# Patient Record
Sex: FEMALE | Race: WHITE | NOT HISPANIC OR LATINO | Employment: FULL TIME | URBAN - METROPOLITAN AREA
[De-identification: names, ages, dates, MRNs, and addresses within clinical notes are randomized per-mention and may not be internally consistent; named-entity substitution may affect disease eponyms.]

---

## 2021-04-26 ENCOUNTER — OFFICE VISIT (OUTPATIENT)
Dept: INTERNAL MEDICINE CLINIC | Facility: CLINIC | Age: 26
End: 2021-04-26
Payer: COMMERCIAL

## 2021-04-26 ENCOUNTER — CLINICAL SUPPORT (OUTPATIENT)
Dept: INTERNAL MEDICINE CLINIC | Age: 26
End: 2021-04-26
Payer: COMMERCIAL

## 2021-04-26 VITALS
WEIGHT: 117.2 LBS | DIASTOLIC BLOOD PRESSURE: 60 MMHG | BODY MASS INDEX: 20.01 KG/M2 | OXYGEN SATURATION: 98 % | TEMPERATURE: 98 F | HEIGHT: 64 IN | HEART RATE: 114 BPM | SYSTOLIC BLOOD PRESSURE: 94 MMHG

## 2021-04-26 DIAGNOSIS — Z09 NEED FOR IMMUNIZATION FOLLOW-UP: ICD-10-CM

## 2021-04-26 DIAGNOSIS — Z30.41 ENCOUNTER FOR SURVEILLANCE OF CONTRACEPTIVE PILLS: ICD-10-CM

## 2021-04-26 DIAGNOSIS — K50.119 CROHN'S DISEASE OF LARGE INTESTINE WITH COMPLICATION (HCC): ICD-10-CM

## 2021-04-26 DIAGNOSIS — Z13.228 SCREENING FOR METABOLIC DISORDER: Primary | ICD-10-CM

## 2021-04-26 DIAGNOSIS — Z23 NEED FOR TD VACCINE: ICD-10-CM

## 2021-04-26 DIAGNOSIS — Z23 NEED FOR INFLUENZA VACCINATION: Primary | ICD-10-CM

## 2021-04-26 PROCEDURE — 90686 IIV4 VACC NO PRSV 0.5 ML IM: CPT

## 2021-04-26 PROCEDURE — 99203 OFFICE O/P NEW LOW 30 MIN: CPT | Performed by: NURSE PRACTITIONER

## 2021-04-26 PROCEDURE — 90471 IMMUNIZATION ADMIN: CPT

## 2021-04-26 PROCEDURE — 90714 TD VACC NO PRESV 7 YRS+ IM: CPT

## 2021-04-26 PROCEDURE — 90472 IMMUNIZATION ADMIN EACH ADD: CPT

## 2021-04-26 RX ORDER — ACETAMINOPHEN AND CODEINE PHOSPHATE 120; 12 MG/5ML; MG/5ML
1 SOLUTION ORAL DAILY
COMMUNITY
End: 2021-04-26

## 2021-04-26 RX ORDER — ACETAMINOPHEN AND CODEINE PHOSPHATE 120; 12 MG/5ML; MG/5ML
1 SOLUTION ORAL DAILY
Qty: 30 TABLET | Refills: 6 | Status: SHIPPED | OUTPATIENT
Start: 2021-04-26

## 2021-04-26 RX ORDER — MULTIVITAMIN
1 TABLET ORAL DAILY
COMMUNITY

## 2021-04-26 RX ORDER — USTEKINUMAB 90 MG/ML
90 INJECTION, SOLUTION SUBCUTANEOUS
COMMUNITY
End: 2021-05-26 | Stop reason: SDUPTHER

## 2021-04-26 NOTE — ASSESSMENT & PLAN NOTE
Patient will get influenza and TDap vaccination at [facility] location today  Handout give for HPV vaccination

## 2021-04-26 NOTE — PROGRESS NOTES
Assessment/Plan:    Crohn's disease of large intestine with complication [facility])  Patient has appointment in May with [facility],  Continue with Stelara  Screening for metabolic disorder  Will get updated fasting blood work  Encounter for surveillance of contraceptive pills  Refill on Micronor sent  Need for immunization follow-up  Patient will get influenza and TDap vaccination at [facility] [place] location today  Handout give for HPV vaccination  Diagnoses and all orders for this visit:    Screening for metabolic disorder  -     Comprehensive metabolic panel; Future  -     CBC and differential  -     Lipid panel    Encounter for surveillance of contraceptive pills  -     norethindrone (MICRONOR) 0 35 MG tablet; Take 1 tablet (0 35 mg total) by mouth daily    Crohn's disease of large intestine with complication ([address] )    Need for immunization follow-up    Other orders  -     ustekinumab (Stelara) 90 mg/mL subcutaneous injection; Inject 90 mg under the skin every 56 days  -     Probiotic Product (PROBIOTIC-10 PO); Take by mouth daily  -     Multiple Vitamin (multivitamin) tablet; Take 1 tablet by mouth daily  -     Discontinue: norethindrone (Natalia-BE) 0 35 MG tablet; Take 1 tablet by mouth daily          Subjective:      Patient ID: Zoie Gallo is a 22 y o  female  Patient presents today to establish care with our practice  Patient has history of Crohn's disease  Moved from [place] to [place] for a job as a   Crohns Disease- has been on Stelara for about a year, will establish with [facility], last colonoscopy 2019 [facility]       Family history of Breast History, mother passed away from breast CA, diagnosed at age 45s  Patient reports does not do self breast exams       Cervical cancer screening- thinks she had one last year, but will get records       The following portions of the patient's history were reviewed and updated as appropriate: allergies, current medications, past family history, past medical history, past social history, past surgical history and problem list     Review of Systems   Constitutional: Negative for activity change, appetite change, chills, diaphoresis and fever  HENT: Negative for congestion, ear discharge, ear pain, postnasal drip, rhinorrhea, sinus pressure, sinus pain and sore throat  Eyes: Negative for pain, discharge, itching and visual disturbance  Respiratory: Negative for cough, chest tightness, shortness of breath and wheezing  Cardiovascular: Negative for chest pain, palpitations and leg swelling  Gastrointestinal: Negative for abdominal pain, constipation, diarrhea, nausea and vomiting  Endocrine: Negative for polydipsia, polyphagia and polyuria  Genitourinary: Negative for difficulty urinating, dysuria and urgency  Musculoskeletal: Negative for arthralgias, back pain and neck pain  Skin: Negative for rash and wound  Neurological: Negative for dizziness, weakness, numbness and headaches  Past Medical History:   Diagnosis Date    Crohn disease ([facility] )          Current Outpatient Medications:     Multiple Vitamin (multivitamin) tablet, Take 1 tablet by mouth daily, Disp: , Rfl:     Probiotic Product (PROBIOTIC-10 PO), Take by mouth daily, Disp: , Rfl:     ustekinumab (Stelara) 90 mg/mL subcutaneous injection, Inject 90 mg under the skin every 56 days, Disp: , Rfl:     norethindrone (MICRONOR) 0 35 MG tablet, Take 1 tablet (0 35 mg total) by mouth daily, Disp: 30 tablet, Rfl: 6    No Known Allergies    Social History   History reviewed  No pertinent surgical history    Family History   Problem Relation Age of Onset    Breast cancer additional onset Mother    [de-identified] ADD / ADHD Father     Diabetes Maternal Grandfather     Crohn's disease Paternal Grandfather        Objective:  BP 94/60 (BP Location: Left arm, Patient Position: Sitting, Cuff Size: Adult)   Pulse (!) 114   Temp 98 °F (36 7 °C) (Tympanic)   Ht 5' 4 29" (1 633 m) Wt 53 2 kg (117 lb 3 2 oz)   SpO2 98% Comment: Room Air  BMI 19 94 kg/m²     Recent Results (from the past 1344 hour(s))   NOVEL CORONAVIRUS (COVID-19), PCR [facility]    Collection Time: 03/13/21 10:25 AM   Result Value Ref Range    SARS-CoV-2 Negative Negative            Physical Exam  Constitutional:       General: She is not in acute distress  Appearance: She is well-developed  She is not diaphoretic  HENT:      Head: Normocephalic and atraumatic  Right Ear: External ear normal       Left Ear: External ear normal       Nose: Nose normal       Mouth/Throat:      Pharynx: No oropharyngeal exudate  Eyes:      General:         Right eye: No discharge  Left eye: No discharge  Conjunctiva/sclera: Conjunctivae normal       Pupils: Pupils are equal, round, and reactive to light  Neck:      Musculoskeletal: Normal range of motion and neck supple  Thyroid: No thyromegaly  Cardiovascular:      Rate and Rhythm: Normal rate and regular rhythm  Heart sounds: Normal heart sounds  No murmur  No friction rub  No gallop  Pulmonary:      Effort: Pulmonary effort is normal  No respiratory distress  Breath sounds: Normal breath sounds  No stridor  No wheezing or rales  Abdominal:      General: Bowel sounds are normal  There is no distension  Palpations: Abdomen is soft  Tenderness: There is no abdominal tenderness  Lymphadenopathy:      Cervical: No cervical adenopathy  Skin:     General: Skin is warm and dry  Findings: No erythema or rash  Neurological:      Mental Status: She is alert and oriented to person, place, and time  Psychiatric:         Behavior: Behavior normal          Thought Content:  Thought content normal          Judgment: Judgment normal

## 2021-05-05 ENCOUNTER — OFFICE VISIT (OUTPATIENT)
Dept: GASTROENTEROLOGY | Facility: MEDICAL CENTER | Age: 26
End: 2021-05-05
Payer: COMMERCIAL

## 2021-05-05 VITALS
HEART RATE: 84 BPM | DIASTOLIC BLOOD PRESSURE: 70 MMHG | BODY MASS INDEX: 20.49 KG/M2 | WEIGHT: 120 LBS | TEMPERATURE: 98 F | HEIGHT: 64 IN | SYSTOLIC BLOOD PRESSURE: 112 MMHG

## 2021-05-05 DIAGNOSIS — K50.919 CROHN'S DISEASE WITH COMPLICATION, UNSPECIFIED GASTROINTESTINAL TRACT LOCATION (HCC): Primary | ICD-10-CM

## 2021-05-05 PROCEDURE — 99204 OFFICE O/P NEW MOD 45 MIN: CPT | Performed by: INTERNAL MEDICINE

## 2021-05-05 RX ORDER — USTEKINUMAB 90 MG/ML
90 INJECTION, SOLUTION SUBCUTANEOUS ONCE
COMMUNITY
End: 2021-05-26

## 2021-05-05 RX ORDER — ACETAMINOPHEN AND CODEINE PHOSPHATE 120; 12 MG/5ML; MG/5ML
1 SOLUTION ORAL DAILY
COMMUNITY
Start: 2021-04-26

## 2021-05-05 NOTE — PROGRESS NOTES
[facility] - Outpatient Consultation  Claudio Fine 22 y o  female MRN: [id number]  Encounter: [id number]          ASSESSMENT AND PLAN:  27-year-old female with history of Crohn's disease diagnosed 2013 currently on Stelara every 8 weeks who presents for new patient evaluation  1  Crohn's disease with complication, unspecified gastrointestinal tract location [facility])  She reports diagnosis of Crohn's disease 8 years ago in the setting of abdominal pain  She will have her records from her outside GI provider faxed to that we may review the distribution and severity of her illness  Her prior treatments have included Pentasa, prednisone and Humira  She was transition to Stelara approximately 2 years ago  She states colonoscopy prior to Stelara initiation showed active disease  She reports fatigue and intermittent episodes of abdominal pain  At this time she feels well and is having regular, formed bowel movements and is pain free  I have ordered blood work today including CBC, hepatic function panel, iron studies, B12 and vitamin D  She will also obtain Stelara level and antibody prior to her next injection  She will be scheduled for EGD and colonoscopy for up-to-date assessment of her inflammatory bowel disease and affect of the Stelara  - Continue treatment with Stelara every 8 weeks  - Obtain laboratory work today including: CBC, hepatic function panel, b12, vitamin D, iron studies,   - Your next colonoscopy is due in the next 1-3 months  EGD will be performed at that time    Health maintenance:  - Yearly flu shot  Avoid live vaccines  - Pneumovax and Prevnar 13 every 5 years  - gyn referral placed today for routine Pap smear  - Yearly skin exam       - Vitamin D 25 hydroxy; Future  - Basic metabolic panel; Future  - CBC and differential; Future  - Hepatic function panel; Future  - Iron Panel (Includes Ferritin, Iron Sat%, Iron, and TIBC);  Future  - MISCELLANEOUS LAB TEST; Future  - Colonoscopy; Future  - EGD; Future  - Ambulatory referral to Gynecologic Oncology; Future    Return to office in 3 months  ______________________________________________________________________    HPI:  Wilmer Amin is a 22 y o  female with a history of Crohn's disease currently on Stelara who presents for new patient evaluation  She reports being diagnosed with Crohn's disease in 2013 after presenting with abdominal pain  She does not recall if she was having change in bowel habits at that time  She underwent colonoscopy in [place] and was diagnosed with Crohn's  She is unsure the distribution or the severity of her disease at that time  Records are not available today for review  In regards to treatment she states she was 1st started on Pentasa which worked for approximately a year  She then required prednisone in addition to the Pentasa and had no improvement of her symptoms  At that time she was switched to another pill medication but does not recall the name  She was quickly transition to Humira which she was using for 2-3 years but then discontinued 2 years ago due to a not working  She was then transitioned to [facility] which she has been taking for the last 2 years  She uses Stelara every 8 weeks  She reports her last colonoscopy was in 2019 prior to Stelara initiation and reports inflammation was seen at that time but is unsure of the distribution of the severity  She currently has no abdominal pain but states her flares include lower abdominal crampy discomfort  She notes fatigue  Her appetite is moderate  Her weight is stable  She reports 1-2 formed bowel movements per day without melena or hematochezia  She reports no extra intestinal manifestation of her Crohn's disease in the past   She has not required prednisone for many years     She reports family history of her paternal grandfather and her brother with Crohn's disease    She has no history of brenda-anal disease    She reports a prior endoscopy many years ago but is unsure of the results   As stated above she reports her last colonoscopy was 2 years ago but is unsure what it showed    REVIEW OF SYSTEMS:    CONSTITUTIONAL: Denies any fever, chills, rigors, and weight loss  HEENT: No earache or tinnitus  Denies hearing loss or visual disturbances  CARDIOVASCULAR: No chest pain or palpitations  RESPIRATORY: Denies any cough, hemoptysis, shortness of breath or dyspnea on exertion  GASTROINTESTINAL: As noted in the History of Present Illness  GENITOURINARY: No problems with urination  Denies any hematuria or dysuria  NEUROLOGIC: No dizziness or vertigo, denies headaches  MUSCULOSKELETAL: Denies any muscle or joint pain  SKIN: Denies skin rashes or itching  ENDOCRINE: Denies excessive thirst  Denies intolerance to heat or cold  PSYCHOSOCIAL: Denies depression or anxiety  Denies any recent memory loss  Historical Information   No past medical history on file  No past surgical history on file  Social History   Social History     Substance and Sexual Activity   Alcohol Use Not on file     Social History     Substance and Sexual Activity   Drug Use Not on file     Social History     Tobacco Use   Smoking Status Not on file     No family history on file  Meds/Allergies     No current outpatient medications on file  Not on File        Objective     Blood pressure 112/70, pulse 84, temperature 98 °F (36 7 °C), temperature source Tympanic, height 5' 4" (1 626 m), weight 54 4 kg (120 lb)  There is no height or weight on file to calculate BMI  PHYSICAL EXAM:      General Appearance:   Alert, cooperative, no distress   HEENT:   Normocephalic, atraumatic, anicteric  Neck:  Supple, symmetrical, trachea midline   Lungs:   Clear to auscultation bilaterally; no rales, rhonchi or wheezing; respirations unlabored    Heart[de-identified]   Regular rate and rhythm; no murmur, rub, or gallop  Abdomen:   Soft, mild lower abdominal tenderness to deep palpation without rebound or guarding non-distended; normal bowel sounds; no masses, no organomegaly    Genitalia:   Deferred    Rectal:   Deferred    Extremities:  No cyanosis, clubbing or edema    Pulses:  2+ and symmetric    Skin:  No jaundice, rashes, or lesions    Lymph nodes:  No palpable cervical lymphadenopathy        Lab Results:   No visits with results within 1 Day(s) from this visit  Latest known visit with results is:   No results found for any previous visit  Radiology Results:   No results found

## 2021-05-05 NOTE — PATIENT INSTRUCTIONS
The patient is scheduled at [facility] for a colon/egd with Dr Shivam Fu on 7/21/2021  Miralax/dulcolax prep instructions have been gone over in the office, with the patient, by the MA  The patient is aware that they will receive a call with the arrival time the day prior to procedure and that they will need a  the day of the procedure   I have asked the patient to call with any questions that they might have prior to procedure

## 2021-05-26 ENCOUNTER — TELEPHONE (OUTPATIENT)
Dept: GASTROENTEROLOGY | Facility: AMBULARY SURGERY CENTER | Age: 26
End: 2021-05-26

## 2021-05-26 DIAGNOSIS — R10.9 ABDOMINAL PAIN, UNSPECIFIED ABDOMINAL LOCATION: ICD-10-CM

## 2021-05-26 DIAGNOSIS — K50.119 CROHN'S DISEASE OF LARGE INTESTINE WITH COMPLICATION (HCC): Primary | ICD-10-CM

## 2021-05-26 RX ORDER — USTEKINUMAB 90 MG/ML
90 INJECTION, SOLUTION SUBCUTANEOUS
Qty: 1 ML | Refills: 5 | Status: SHIPPED | OUTPATIENT
Start: 2021-05-26 | End: 2021-05-28 | Stop reason: SDUPTHER

## 2021-05-26 RX ORDER — DICYCLOMINE HCL 20 MG
20 TABLET ORAL EVERY 6 HOURS
Qty: 60 TABLET | Refills: 2 | Status: SHIPPED | OUTPATIENT
Start: 2021-05-26

## 2021-05-26 NOTE — TELEPHONE ENCOUNTER
Please tell patient I sent in stool studies for her to do if loosee BMs continue so we can decipher infectious versus inflammatory process  I also sent in blood work that she should get done, as well  I sent in CT scan for her to do to further evaluate the pain  I sent in the Rx for the stelara  Bentyl PRN for pain sent  Will hold off on getting stelara level/antibody level just yet because she has not had her most recent dose and this can alter the levels

## 2021-05-26 NOTE — TELEPHONE ENCOUNTER
Patients GI provider:  Dr Kyung Scott    Number to return call: [phone]    Reason for call: Pt calling because 5/25/21 she woke up with right side abd pain hurts to move around a lot;     Scheduled procedure/appointment date if applicable: 8/7/73

## 2021-05-26 NOTE — TELEPHONE ENCOUNTER
Hx crohn's     Patient reports she woke up with right sided stabbing abdominal pain yesterday  Pain ranges from 5-8 out of 10 and states it is different than any pain she has had in the past - increases with movement  Denies n/v or fever  Bowels are normal with one loose BM/ no blood or mucous today  Following dietary restrictions and avoiding trigger foods  Stelara every 8 weeks was due yesterday but she does not have and did not request refill  She has new insurance so prior authorization will be required  Recommended she complete ordered labs and schedule egd/colonoscopy  *provider -stelara order entered  Please review and sign    **clinical- please initiate prior authorization for stelarea    *- please contact patient to schedule egd/ colonoscopy      Trial bentyl for abdominal pain? Imaging?

## 2021-05-26 NOTE — TELEPHONE ENCOUNTER
Reviewed attached provider recommendations  Lab slips e mailed to [email or web address] as requested  Bentyl script sent to her pharmacy  Central scheduling number provided to schedule CT scan  She is aware stelara ordered and sent to medical assistant to initiate prior authorization

## 2021-05-26 NOTE — TELEPHONE ENCOUNTER
Yes, she already has a stelara level sent from Dr Shivam Fu that she should do prior to getting her next dose  I'm saying she does not need to get one at this moment, unless she's able to get her stelara soon (but I am unsure of how soon it will be as she needs prior auth)    If it's easier for her to get it done now with the rest of the testing, that's fine but please tell patient she may need to get it done again IF the prior auth takes some time to come through

## 2021-05-26 NOTE — TELEPHONE ENCOUNTER
Confirming- you do not want the patient to get Stelara level / antibody level  I thought it is usually completed prior to receiving dose

## 2021-05-28 DIAGNOSIS — K50.119 CROHN'S DISEASE OF LARGE INTESTINE WITH COMPLICATION (HCC): ICD-10-CM

## 2021-05-28 RX ORDER — USTEKINUMAB 90 MG/ML
90 INJECTION, SOLUTION SUBCUTANEOUS
Qty: 1 ML | Refills: 5 | Status: SHIPPED | OUTPATIENT
Start: 2021-05-28 | End: 2021-08-27

## 2021-05-28 NOTE — TELEPHONE ENCOUNTER
Prior auth approved # [id number], valid 5/28/21 - 5/28/22  Please resend Stelara Rx to [facility] pharmacy  Thank you

## 2021-05-28 NOTE — TELEPHONE ENCOUNTER
Spoke with pharmacist, they will contact patient to set up delivery  Spoke with patient and made her aware of approval, she is aware pharmacy will contact her

## 2021-06-03 ENCOUNTER — TELEPHONE (OUTPATIENT)
Dept: GASTROENTEROLOGY | Facility: MEDICAL CENTER | Age: 26
End: 2021-06-03

## 2021-06-03 ENCOUNTER — HOSPITAL ENCOUNTER (OUTPATIENT)
Dept: CT IMAGING | Facility: HOSPITAL | Age: 26
Discharge: HOME | End: 2021-06-03
Payer: COMMERCIAL

## 2021-06-03 DIAGNOSIS — D50.9 IRON DEFICIENCY ANEMIA, UNSPECIFIED IRON DEFICIENCY ANEMIA TYPE: ICD-10-CM

## 2021-06-03 DIAGNOSIS — K50.119 CROHN'S DISEASE OF LARGE INTESTINE WITH COMPLICATION (HCC): ICD-10-CM

## 2021-06-03 DIAGNOSIS — D50.9 IRON DEFICIENCY ANEMIA, UNSPECIFIED IRON DEFICIENCY ANEMIA TYPE: Primary | ICD-10-CM

## 2021-06-03 DIAGNOSIS — R10.9 ABDOMINAL PAIN, UNSPECIFIED ABDOMINAL LOCATION: ICD-10-CM

## 2021-06-03 DIAGNOSIS — R79.89 LOW VITAMIN D LEVEL: ICD-10-CM

## 2021-06-03 LAB
ALBUMIN SERPL-MCNC: 2.9 G/DL (ref 3.6–5.1)
ALBUMIN/GLOB SERPL: 1.2 (CALC) (ref 1–2.5)
ALP SERPL-CCNC: 74 U/L (ref 31–125)
ALT SERPL-CCNC: 10 U/L (ref 6–29)
AST SERPL-CCNC: 11 U/L (ref 10–30)
BILIRUB SERPL-MCNC: 0.3 MG/DL (ref 0.2–1.2)
BUN SERPL-MCNC: 7 MG/DL (ref 7–25)
BUN/CREAT SERPL: ABNORMAL (CALC) (ref 6–22)
CALCIUM SERPL-MCNC: 8.4 MG/DL (ref 8.6–10.2)
CHLORIDE SERPL-SCNC: 105 MMOL/L (ref 98–110)
CO2 SERPL-SCNC: 31 MMOL/L (ref 20–32)
CREAT SERPL-MCNC: 0.56 MG/DL (ref 0.5–1.1)
CRP SERPL-MCNC: 42.8 MG/L
ERYTHROCYTE [DISTWIDTH] IN BLOOD BY AUTOMATED COUNT: 14 % (ref 11–15)
GLOBULIN SER CALC-MCNC: 2.5 G/DL (CALC) (ref 1.9–3.7)
GLUCOSE SERPL-MCNC: 100 MG/DL (ref 65–139)
HCT VFR BLD AUTO: 34.2 % (ref 35–45)
HGB BLD-MCNC: 10.5 G/DL (ref 11.7–15.5)
MCH RBC QN AUTO: 23.2 PG (ref 27–33)
MCHC RBC AUTO-ENTMCNC: 30.7 G/DL (ref 32–36)
MCV RBC AUTO: 75.7 FL (ref 80–100)
PLATELET # BLD AUTO: 375 THOUSAND/UL (ref 140–400)
PMV BLD REES-ECKER: 9.4 FL (ref 7.5–12.5)
POTASSIUM SERPL-SCNC: 4.4 MMOL/L (ref 3.5–5.3)
PROT SERPL-MCNC: 5.4 G/DL (ref 6.1–8.1)
RBC # BLD AUTO: 4.52 MILLION/UL (ref 3.8–5.1)
SL AMB EGFR AFRICAN AMERICAN: 150 ML/MIN/1.73M2
SL AMB EGFR NON AFRICAN AMERICAN: 130 ML/MIN/1.73M2
SODIUM SERPL-SCNC: 141 MMOL/L (ref 135–146)
WBC # BLD AUTO: 4.4 THOUSAND/UL (ref 3.8–10.8)

## 2021-06-03 PROCEDURE — 74177 CT ABD & PELVIS W/CONTRAST: CPT

## 2021-06-03 PROCEDURE — G1004 CDSM NDSC: HCPCS

## 2021-06-03 RX ORDER — FERROUS SULFATE TAB EC 324 MG (65 MG FE EQUIVALENT) 324 (65 FE) MG
324 TABLET DELAYED RESPONSE ORAL
Qty: 30 TABLET | Refills: 3 | Status: SHIPPED | OUTPATIENT
Start: 2021-06-03 | End: 2021-06-03 | Stop reason: SDUPTHER

## 2021-06-03 RX ORDER — FERROUS SULFATE TAB EC 324 MG (65 MG FE EQUIVALENT) 324 (65 FE) MG
324 TABLET DELAYED RESPONSE ORAL
Qty: 30 TABLET | Refills: 3 | Status: SHIPPED | OUTPATIENT
Start: 2021-06-03

## 2021-06-03 RX ORDER — OMEGA-3S/DHA/EPA/FISH OIL/D3 300MG-1000
800 CAPSULE ORAL DAILY
Qty: 60 TABLET | Refills: 3 | Status: SHIPPED | OUTPATIENT
Start: 2021-06-03

## 2021-06-03 RX ORDER — OMEGA-3S/DHA/EPA/FISH OIL/D3 300MG-1000
800 CAPSULE ORAL DAILY
Qty: 60 TABLET | Refills: 3 | Status: SHIPPED | OUTPATIENT
Start: 2021-06-03 | End: 2021-06-03 | Stop reason: SDUPTHER

## 2021-06-03 RX ADMIN — IOHEXOL 100 ML: 350 INJECTION, SOLUTION INTRAVENOUS at 19:18

## 2021-06-03 NOTE — TELEPHONE ENCOUNTER
Spoke to pt aware of results and recommendations     Can you please send her Iron and Vit D to the [facility] in her chart, it was sent to her mail order    Thank you

## 2021-06-03 NOTE — RESULT ENCOUNTER NOTE
My medical assistant will call patient with results  bloodwork shows iron deficiency, mild anemia and low vitamin d  I have sent iron supplement and vitamin d supplement to her pharmacy   Goal vitamin D is 600-800 international units daily    We will repeat bloodwork in a few months to ensure they are improving  She is scheduled for egd/colonoscopy with me next month

## 2021-06-03 NOTE — TELEPHONE ENCOUNTER
----- Message from Katia Valladares MD sent at 6/3/2021 12:37 PM EDT -----  My medical assistant will call patient with results  bloodwork shows iron deficiency, mild anemia and low vitamin d  I have sent iron supplement and vitamin d supplement to her pharmacy   Goal vitamin D is 600-800 international units daily    We will repeat bloodwork in a few months to ensure they are improving  She is scheduled for egd/colonoscopy with me next month

## 2021-06-03 NOTE — LETTER
[address] [place] [address]  [address] [facility] [address]      Joan 15[birth year] 2021    MRN: [id number]     Phone: [phone]     Dear Ms  Jennifer Gonzalez recently had a(n) Cat Scan performed on 6/3/2021 at  [address] [place] [address] that was requested by Arnold Maldonado PA-C  The study was reviewed by a radiologist, which is a physician who specializes in medical imaging  The radiologist issued a report describing his or her findings  In that report there was a finding that the radiologist felt warranted further discussion with your health care provider and that discussion would be beneficial to you  The results were sent to Arnold Maldonado PA-C on    We recommend that you contact Arnold Maldonado PA-C at [phone] or set up an appointment to discuss the results of the imaging test  If you have already heard from Arnold Maldonado PA-C regarding the results of your study, you can disregard this letter  This letter is not meant to alarm you, but intended to encourage you to follow-up on your results with the provider that sent you for the imaging study  In addition, we have enclosed answers to frequently asked questions by other patients who have also received a letter to review results with their health care provider (see page two)  Thank you for choosing [facility] [employer] [facility] for your medical imaging needs  FREQUENTLY ASKED QUESTIONS    1  Why am I receiving this letter? [address] [place] [address] requires us to notify patients who have findings on imaging exams that may require more testing or follow-up with a health professional within the next 3 months  2  How serious is the finding on the imaging test?  This letter is sent to all patients who may need follow-up or more testing within the next 3 months  Receiving this letter does not necessarily mean you have a life-threatening imaging finding or disease  Recommendations in the radiologists imaging report are general in nature and it is up to your healthcare provider to say whether those recommendations make sense for your situation  You are strongly encouraged to talk to your health care provider about the results and ask whether additional steps need to be taken  3  Where can I get a copy of the final report for my recent radiology exam?  To get a full copy of the report you can access your records online at [email or web address] or please contact [address] [place] [address] Department at [phone] Monday through Friday between 8 am and 6 pm          4  What do I need to do now? Please contact your health care provider who requested the imaging study to discuss what further actions (if any) are needed  You may have already heard from (your ordering provider) in regard to this test in which case you can disregard this letter  NOTICE IN ACCORDANCE WITH THE [facility] PATIENT TEST RESULT INFORMATION ACT OF 2018    You are receiving this notice as a result of a determination by your diagnostic imaging service that further discussions of your test results are warranted and would be beneficial to you  The complete results of your test or tests have been or will be sent to the health care practitioner that ordered the test or tests  It is recommended that you contact your health care practitioner to discuss your results as soon as possible

## 2021-06-04 LAB
25(OH)D3 SERPL-MCNC: 25 NG/ML (ref 30–100)
ALBUMIN SERPL-MCNC: 2.9 G/DL (ref 3.6–5.1)
ALBUMIN/GLOB SERPL: 1.2 (CALC) (ref 1–2.5)
ALP SERPL-CCNC: 73 U/L (ref 31–125)
ALT SERPL-CCNC: 10 U/L (ref 6–29)
AST SERPL-CCNC: 11 U/L (ref 10–30)
BASOPHILS # BLD AUTO: 18 CELLS/UL (ref 0–200)
BASOPHILS NFR BLD AUTO: 0.4 %
BILIRUB DIRECT SERPL-MCNC: 0.1 MG/DL
BILIRUB INDIRECT SERPL-MCNC: 0.2 MG/DL (CALC) (ref 0.2–1.2)
BILIRUB SERPL-MCNC: 0.3 MG/DL (ref 0.2–1.2)
BUN SERPL-MCNC: 7 MG/DL (ref 7–25)
BUN/CREAT SERPL: ABNORMAL (CALC) (ref 6–22)
CALCIUM SERPL-MCNC: 8.4 MG/DL (ref 8.6–10.2)
CHLORIDE SERPL-SCNC: 106 MMOL/L (ref 98–110)
CO2 SERPL-SCNC: 32 MMOL/L (ref 20–32)
CREAT SERPL-MCNC: 0.58 MG/DL (ref 0.5–1.1)
EOSINOPHIL # BLD AUTO: 99 CELLS/UL (ref 15–500)
EOSINOPHIL NFR BLD AUTO: 2.2 %
ERYTHROCYTE [DISTWIDTH] IN BLOOD BY AUTOMATED COUNT: 13.8 % (ref 11–15)
FERRITIN SERPL-MCNC: 10 NG/ML (ref 16–154)
GLOBULIN SER CALC-MCNC: 2.5 G/DL (CALC) (ref 1.9–3.7)
GLUCOSE SERPL-MCNC: 100 MG/DL (ref 65–99)
HCT VFR BLD AUTO: 33.4 % (ref 35–45)
HGB BLD-MCNC: 10.3 G/DL (ref 11.7–15.5)
IRON SATN MFR SERPL: 9 % (CALC) (ref 16–45)
IRON SERPL-MCNC: 25 MCG/DL (ref 40–190)
LYMPHOCYTES # BLD AUTO: 1143 CELLS/UL (ref 850–3900)
LYMPHOCYTES NFR BLD AUTO: 25.4 %
MCH RBC QN AUTO: 23.1 PG (ref 27–33)
MCHC RBC AUTO-ENTMCNC: 30.8 G/DL (ref 32–36)
MCV RBC AUTO: 75.1 FL (ref 80–100)
MONOCYTES # BLD AUTO: 576 CELLS/UL (ref 200–950)
MONOCYTES NFR BLD AUTO: 12.8 %
NEUTROPHILS # BLD AUTO: 2664 CELLS/UL (ref 1500–7800)
NEUTROPHILS NFR BLD AUTO: 59.2 %
PLATELET # BLD AUTO: 361 THOUSAND/UL (ref 140–400)
PMV BLD REES-ECKER: 9.3 FL (ref 7.5–12.5)
POTASSIUM SERPL-SCNC: 4.3 MMOL/L (ref 3.5–5.3)
PROT SERPL-MCNC: 5.4 G/DL (ref 6.1–8.1)
RBC # BLD AUTO: 4.45 MILLION/UL (ref 3.8–5.1)
SL AMB EGFR AFRICAN AMERICAN: 148 ML/MIN/1.73M2
SL AMB EGFR NON AFRICAN AMERICAN: 128 ML/MIN/1.73M2
SODIUM SERPL-SCNC: 141 MMOL/L (ref 135–146)
TIBC SERPL-MCNC: 271 MCG/DL (CALC) (ref 250–450)
VIT B12 SERPL-MCNC: 331 PG/ML (ref 200–1100)
WBC # BLD AUTO: 4.5 THOUSAND/UL (ref 3.8–10.8)

## 2021-06-14 ENCOUNTER — TELEPHONE (OUTPATIENT)
Dept: GASTROENTEROLOGY | Facility: MEDICAL CENTER | Age: 26
End: 2021-06-14

## 2021-06-14 ENCOUNTER — TELEPHONE (OUTPATIENT)
Dept: GASTROENTEROLOGY | Facility: CLINIC | Age: 26
End: 2021-06-14

## 2021-06-14 DIAGNOSIS — K50.119 CROHN'S DISEASE OF LARGE INTESTINE WITH COMPLICATION (HCC): Primary | ICD-10-CM

## 2021-06-14 NOTE — TELEPHONE ENCOUNTER
Dr Silvia Marie: I thought I sent you a message regarding this pt last night but it does not seem to be showing up in her chart  CT showed signs of inflammatory changes in ileum; also showed moderate distention of stomach and cholelithiasis  She has EGD/Colon with you scheduled for next month already but I was wondering if there was anything else you wanted to do for the pt in the meantime? Thanks!

## 2021-06-14 NOTE — TELEPHONE ENCOUNTER
I called patient to results of her CT scan however call went to voicemail   She was instructed to return call to the office  CT scan shows inflammation in her distal small intestine meaning active Crohn's disease      She is currently scheduled for EGD and colonoscopy in July however my team will contact her to schedule a sooner procedure given the abnormal CT findings  She is also ordered for stool are level to be obtained just prior to her next injection

## 2021-06-14 NOTE — TELEPHONE ENCOUNTER
Thank you    I will call her to review the findings  We will also see if she can be scheduled for a sooner colonoscopy and get the Stelara level checked prior to her next dose

## 2021-06-14 NOTE — TELEPHONE ENCOUNTER
Patients GI provider:  Dr Krystian Black    Number to return call: N/A    Reason for call: Radiology calling with significant findings in CT abdomen pelvis w contrast  Ready to read in Epic  Tiger Text sent      Scheduled procedure/appointment date if applicable: N/A

## 2021-06-18 ENCOUNTER — TELEPHONE (OUTPATIENT)
Dept: GASTROENTEROLOGY | Facility: CLINIC | Age: 26
End: 2021-06-18

## 2021-06-18 NOTE — TELEPHONE ENCOUNTER
I called patient to discuss recent CT scan finding showing active Crohn's disease in the terminal ileum with suspicion for fibrostenotic changes  She reports prior diarrhea which has since resolved  She continues to have abdominal pain which is mild to moderate  She is still tolerating a diet  She has been unable to receive her Stelara and has now 3-4 weeks overdue from her last dose  I reviewed the CT scan findings with her and stated that she needs a sooner colonoscopy then currently scheduled for the end of July  My office will contact her to schedule this within the next 2-3 weeks  I asked her to wait to obtain the Stelara level given that it will likely be low or undetectable given that she is overdue    We will recheck Stelara level when she has resumed regular dosing  If she has persistent abdominal pain she will call my office to trial a course of budesonide or prednisone if her symptoms persist   If her diarrhea recurs she will also call the office to obtain stool infectious studies and fecal calprotectin  She verbalized understanding of the information and instructions provided

## 2021-06-21 ENCOUNTER — TELEPHONE (OUTPATIENT)
Dept: GASTROENTEROLOGY | Facility: MEDICAL CENTER | Age: 26
End: 2021-06-21

## 2021-06-21 NOTE — TELEPHONE ENCOUNTER
Left voice message for patient to call the office so procedure date can be moved to a sooner date  Left back line office number for patient to call office directly

## 2021-06-29 ENCOUNTER — TELEPHONE (OUTPATIENT)
Dept: GASTROENTEROLOGY | Facility: MEDICAL CENTER | Age: 26
End: 2021-06-29

## 2021-06-29 NOTE — TELEPHONE ENCOUNTER
Left 2nd voicemail instructing patient to call the office   Back line office number was given to schedule directly

## 2021-06-29 NOTE — TELEPHONE ENCOUNTER
----- Message from Katia Valladares MD sent at 6/29/2021 10:07 AM EDT -----  Would you mind calling this patient again regarding a sooner procedure date? She has active Crohn's disease and shouldn't wait until July    I see that we left her a message and she hasn't returned the call yet  Thanks!

## 2021-06-29 NOTE — TELEPHONE ENCOUNTER
Left detailed message for patient to call the office to move her procedure date to a sooner available appointment   Patient again was left the back line office number to contact us directly

## 2021-07-06 ENCOUNTER — TELEPHONE (OUTPATIENT)
Dept: GASTROENTEROLOGY | Facility: CLINIC | Age: 26
End: 2021-07-06

## 2021-07-09 ENCOUNTER — TELEPHONE (OUTPATIENT)
Dept: GASTROENTEROLOGY | Facility: MEDICAL CENTER | Age: 26
End: 2021-07-09

## 2021-07-12 ENCOUNTER — ANESTHESIA (OUTPATIENT)
Dept: GASTROENTEROLOGY | Facility: MEDICAL CENTER | Age: 26
End: 2021-07-12

## 2021-07-12 ENCOUNTER — HOSPITAL ENCOUNTER (OUTPATIENT)
Dept: GASTROENTEROLOGY | Facility: MEDICAL CENTER | Age: 26
Setting detail: OUTPATIENT SURGERY
Discharge: HOME | End: 2021-07-12
Admitting: INTERNAL MEDICINE
Payer: COMMERCIAL

## 2021-07-12 ENCOUNTER — ANESTHESIA EVENT (OUTPATIENT)
Dept: GASTROENTEROLOGY | Facility: MEDICAL CENTER | Age: 26
End: 2021-07-12

## 2021-07-12 VITALS
DIASTOLIC BLOOD PRESSURE: 61 MMHG | RESPIRATION RATE: 18 BRPM | TEMPERATURE: 97.4 F | SYSTOLIC BLOOD PRESSURE: 103 MMHG | HEART RATE: 79 BPM | OXYGEN SATURATION: 100 %

## 2021-07-12 DIAGNOSIS — K50.019 CROHN'S DISEASE OF SMALL INTESTINE WITH COMPLICATION (HCC): Primary | ICD-10-CM

## 2021-07-12 DIAGNOSIS — K50.919 CROHN'S DISEASE WITH COMPLICATION, UNSPECIFIED GASTROINTESTINAL TRACT LOCATION (HCC): ICD-10-CM

## 2021-07-12 LAB
EXT PREGNANCY TEST URINE: NEGATIVE
EXT. CONTROL: NORMAL

## 2021-07-12 PROCEDURE — 88305 TISSUE EXAM BY PATHOLOGIST: CPT | Performed by: PATHOLOGY

## 2021-07-12 PROCEDURE — 88342 IMHCHEM/IMCYTCHM 1ST ANTB: CPT | Performed by: PATHOLOGY

## 2021-07-12 PROCEDURE — 81025 URINE PREGNANCY TEST: CPT | Performed by: ANESTHESIOLOGY

## 2021-07-12 PROCEDURE — 43239 EGD BIOPSY SINGLE/MULTIPLE: CPT | Performed by: INTERNAL MEDICINE

## 2021-07-12 PROCEDURE — 45380 COLONOSCOPY AND BIOPSY: CPT | Performed by: INTERNAL MEDICINE

## 2021-07-12 RX ORDER — PROPOFOL 10 MG/ML
INJECTION, EMULSION INTRAVENOUS AS NEEDED
Status: DISCONTINUED | OUTPATIENT
Start: 2021-07-12 | End: 2021-07-12

## 2021-07-12 RX ORDER — LIDOCAINE HYDROCHLORIDE 20 MG/ML
INJECTION, SOLUTION EPIDURAL; INFILTRATION; INTRACAUDAL; PERINEURAL AS NEEDED
Status: DISCONTINUED | OUTPATIENT
Start: 2021-07-12 | End: 2021-07-12

## 2021-07-12 RX ORDER — BUDESONIDE 3 MG/1
CAPSULE, COATED PELLETS ORAL
Qty: 210 CAPSULE | Refills: 0 | Status: SHIPPED | OUTPATIENT
Start: 2021-07-12 | End: 2021-07-26 | Stop reason: SDUPTHER

## 2021-07-12 RX ORDER — SODIUM CHLORIDE 9 MG/ML
125 INJECTION, SOLUTION INTRAVENOUS CONTINUOUS
Status: DISCONTINUED | OUTPATIENT
Start: 2021-07-12 | End: 2021-07-16 | Stop reason: HOSPADM

## 2021-07-12 RX ADMIN — PROPOFOL 100 MG: 10 INJECTION, EMULSION INTRAVENOUS at 11:26

## 2021-07-12 RX ADMIN — PROPOFOL 50 MG: 10 INJECTION, EMULSION INTRAVENOUS at 11:34

## 2021-07-12 RX ADMIN — PROPOFOL 50 MG: 10 INJECTION, EMULSION INTRAVENOUS at 11:58

## 2021-07-12 RX ADMIN — PROPOFOL 50 MG: 10 INJECTION, EMULSION INTRAVENOUS at 11:28

## 2021-07-12 RX ADMIN — LIDOCAINE HYDROCHLORIDE 100 MG: 20 INJECTION, SOLUTION EPIDURAL; INFILTRATION; INTRACAUDAL; PERINEURAL at 11:26

## 2021-07-12 RX ADMIN — PROPOFOL 50 MG: 10 INJECTION, EMULSION INTRAVENOUS at 11:33

## 2021-07-12 RX ADMIN — PROPOFOL 50 MG: 10 INJECTION, EMULSION INTRAVENOUS at 11:29

## 2021-07-12 RX ADMIN — PROPOFOL 50 MG: 10 INJECTION, EMULSION INTRAVENOUS at 11:38

## 2021-07-12 RX ADMIN — PROPOFOL 50 MG: 10 INJECTION, EMULSION INTRAVENOUS at 11:42

## 2021-07-12 RX ADMIN — PROPOFOL 50 MG: 10 INJECTION, EMULSION INTRAVENOUS at 11:48

## 2021-07-12 RX ADMIN — SODIUM CHLORIDE 125 ML/HR: 0.9 INJECTION, SOLUTION INTRAVENOUS at 10:34

## 2021-07-12 RX ADMIN — PROPOFOL 50 MG: 10 INJECTION, EMULSION INTRAVENOUS at 11:31

## 2021-07-12 RX ADMIN — PROPOFOL 50 MG: 10 INJECTION, EMULSION INTRAVENOUS at 11:45

## 2021-07-12 RX ADMIN — PROPOFOL 50 MG: 10 INJECTION, EMULSION INTRAVENOUS at 11:54

## 2021-07-12 NOTE — H&P
H&P EXAM - Outpatient Endoscopy   Olvin Thorne 32 y o  female MRN: [id number]    [facility] END[facility]   Encounter: [id number]      History and Physical -  [facility]  Olvin Thorne 32 y o  female MRN: [id number]                  HPI: Olvin Thorne is a 32y o  year old female who presents for history of crohn's disease      REVIEW OF SYSTEMS: Per the HPI, and otherwise unremarkable  Historical Information   Past Medical History:   Diagnosis Date    [facility] disease ([facility] )     Crohn's disease ([facility] )      Past Surgical History:   Procedure Laterality Date    COLONOSCOPY  2019    EGD       Social History   Social History     Substance and Sexual Activity   Alcohol Use Not Currently     Social History     Substance and Sexual Activity   Drug Use Never     Social History     Tobacco Use   Smoking Status Never Smoker   Smokeless Tobacco Never Used     Family History   Problem Relation Age of Onset    Breast cancer additional onset Mother    Marisol Sicks ADD / ADHD Father     Diabetes Maternal Grandfather     Crohn's disease Paternal Grandfather        Meds/Allergies     (Not in a hospital admission)      No Known Allergies    Objective     BP 99/56   Pulse 74   Temp (!) 97 4 °F (36 3 °C) (Temporal)   Resp 16   SpO2 99%       PHYSICAL EXAM    Gen: NAD  CV: RRR  CHEST: Clear  ABD: soft, NT/ND  EXT: no edema      ASSESSMENT/PLAN:  This is a 32y o  year old female here for egd/colonoscopy, and she is stable and optimized for her procedure

## 2021-07-12 NOTE — PERIOPERATIVE NURSING NOTE
Discharge instructions reviewed with pt  IV removed and intact upon removal  Pt VSS and pt AAO  Pt tolerated oral intake of solids and liquids  No signs of swallowing difficulty

## 2021-07-12 NOTE — ANESTHESIA PREPROCEDURE EVALUATION
Procedure:  COLONOSCOPY  EGD    Relevant Problems   ANESTHESIA (within normal limits)      CARDIO (within normal limits)      ENDO (within normal limits)      /RENAL (within normal limits)      GYN (within normal limits)      HEMATOLOGY (within normal limits)      MUSCULOSKELETAL (within normal limits)      NEURO/PSYCH (within normal limits)      PULMONARY (within normal limits)      Other   (+) Crohn's disease of large intestine with complication [facility])        Physical Exam    Airway    Mallampati score: I  TM Distance: >3 FB  Neck ROM: full     Dental       Cardiovascular  Rhythm: regular, Rate: normal,     Pulmonary  Breath sounds clear to auscultation,     Other Findings        Anesthesia Plan  ASA Score- 1     Anesthesia Type- IV sedation with anesthesia with ASA Monitors  Additional Monitors:   Airway Plan:           Plan Factors-Exercise tolerance (METS): >4 METS  Patient is not a current smoker  Induction- intravenous  Postoperative Plan-     Informed Consent- Anesthetic plan and risks discussed with patient

## 2021-07-12 NOTE — ANESTHESIA POSTPROCEDURE EVALUATION
Post-Op Assessment Note    CV Status:  Stable    Pain management: adequate     Mental Status:  Alert   Hydration Status:  Euvolemic   PONV Controlled:  Controlled   Airway Patency:  Patent      Post Op Vitals Reviewed: Yes      Staff: Anesthesiologist         No complications documented      BP      Temp     Pulse     Resp      SpO2

## 2021-07-21 DIAGNOSIS — K31.9 MUCOSAL ABNORMALITY OF DUODENUM: Primary | ICD-10-CM

## 2021-07-21 NOTE — RESULT ENCOUNTER NOTE
My medical assistant will call patient with results  Small bowel biopsy shows mild inflammation  This may be medication related or due to Crohn's disease  I have ordered additional blood to make sure it is not from an autoimmune problem  She can have this completed in next few weeks    The colonoscopy biopsy show inflammation related to crohn's disease as we discussed after the colonoscopy   Continue the Stelara and budesonide as prescribed  Office follow up is scheduled for August

## 2021-08-18 ENCOUNTER — TELEPHONE (OUTPATIENT)
Dept: GASTROENTEROLOGY | Facility: MEDICAL CENTER | Age: 26
End: 2021-08-18

## 2021-08-20 NOTE — RESULT ENCOUNTER NOTE
My medical assistant will call patient with results       Bloodwork testing for celiac disease is negative

## 2021-08-26 LAB
IGA SERPL-MCNC: 365 MG/DL (ref 47–310)
TTG IGA SER-ACNC: 1 U/ML
USTEKINUMAB AB SERPL IA-MCNC: <10 AU/ML
USTEKINUMAB SERPL IA-MCNC: 0.3 MCG/ML

## 2021-08-27 ENCOUNTER — OFFICE VISIT (OUTPATIENT)
Dept: GASTROENTEROLOGY | Facility: MEDICAL CENTER | Age: 26
End: 2021-08-27
Payer: COMMERCIAL

## 2021-08-27 ENCOUNTER — TELEPHONE (OUTPATIENT)
Dept: GASTROENTEROLOGY | Facility: CLINIC | Age: 26
End: 2021-08-27

## 2021-08-27 VITALS
WEIGHT: 109.8 LBS | TEMPERATURE: 98 F | SYSTOLIC BLOOD PRESSURE: 97 MMHG | DIASTOLIC BLOOD PRESSURE: 64 MMHG | HEART RATE: 89 BPM | BODY MASS INDEX: 18.85 KG/M2

## 2021-08-27 DIAGNOSIS — D50.9 IRON DEFICIENCY ANEMIA, UNSPECIFIED IRON DEFICIENCY ANEMIA TYPE: ICD-10-CM

## 2021-08-27 DIAGNOSIS — K50.80 CROHN'S DISEASE OF BOTH SMALL AND LARGE INTESTINE WITHOUT COMPLICATION (HCC): Primary | ICD-10-CM

## 2021-08-27 PROCEDURE — 99214 OFFICE O/P EST MOD 30 MIN: CPT | Performed by: INTERNAL MEDICINE

## 2021-08-27 RX ORDER — USTEKINUMAB 90 MG/ML
90 INJECTION, SOLUTION SUBCUTANEOUS
Qty: 1 ML | Refills: 5 | Status: SHIPPED | OUTPATIENT
Start: 2021-08-27 | End: 2021-09-16 | Stop reason: SDUPTHER

## 2021-08-27 RX ORDER — BUDESONIDE 3 MG/1
CAPSULE, COATED PELLETS ORAL
Qty: 210 CAPSULE | Refills: 0 | Status: SHIPPED | OUTPATIENT
Start: 2021-08-27

## 2021-08-27 NOTE — PROGRESS NOTES
[facility] - Outpatient Follow-up Note  Ana Bravo 32 y o  female MRN: [id number]  Encounter: [id number]          ASSESSMENT AND PLAN:   45-year-old female with history of ileocolonic Crohn's disease diagnosed 2013 currently on Stelara every 8 weeks who presents for follow-up evaluation      1  Crohn's disease of both small and large intestine without complication ([facility] )  She reports diagnosis of Crohn's disease 8 years ago in the setting of abdominal pain  Prior treatments have included Pentasa, prednisone and Humira  She was started on Stelara every 8 weeks approximately 2 years ago  She is unsure of the results of her last colonoscopy which was performed around that time prior to Stelara initiation  She underwent repeat endoscopy colonoscopy in July with colonoscopy showing moderate terminal ileal inflammation with stricture which was unable to be traversed by the colonoscope in addition to mild erosions of the sigmoid and descending colon  Colonic pathology showed chronic quiescent colitis and terminal ileal stricture biopsy showed severe inflammation  At that time she was ordered budesonide course but states that this was not approved by her insurance and she was never able to  the medication  She continues to have abdominal pain and low appetite  Her Stelara level recently was undetectable after resuming the medication every 8 weeks for the last 6 months     I again recommend a course of budesonide given her abdominal pain and ileal inflammation as Stelara is re-initiated  If she has no improvement with budesonide she may require course of prednisone      We will discuss with her insurance regarding pre approval for the given her undetected Stelara level recommend increasing the frequency to every 4 weeks and then rechecking a level in a few months to ensure adequate dosing    Six months after re-initiation of the Stelara she will require repeat colonoscopy for further evaluation     We discussed if she has no improvement with Stelara she may require different treatment including Remicade, given that she has failed Humira in the past       We also discussed that treatments for stricture ring terminal ileal disease do include terminal ileal resection however she would like to exhaust all medical treatment prior to consideration of surgery  - budesonide (ENTOCORT EC) 3 MG capsule; Take 9mg (3 pills) daily for 8 weeks Then take 6mg (2 pills) daily for 2 weeks Then take 3mg (one pill) daily for 2 weeks Then stop  Dispense: 210 capsule; Refill: 0  - ustekinumab (Stelara) 90 mg/mL subcutaneous injection; Inject 1 mL (90 mg total) under the skin every 28 days  Dispense: 1 mL; Refill: 5    Discuss IBD Health maintenance at next visit    2  Iron deficiency anemia, unspecified iron deficiency anemia type  She was found to have iron deficiency anemia likely related to her Crohn's disease  She was ordered for iron supplementation daily but is not taking medications given that she is feeling unwell  When she resumed her Stelara she will resume vitamin-D and iron and levels will be rechecked in a few months to ensure they are improving  Follow-up in 2 months      ______________________________________________________________________    SUBJECTIVE:  51-year-old female with history of ileocolonic Crohn's disease diagnosed 2013 currently on Stelara every 8 weeks who presents for follow-up evaluation      Interval history:  July 2021 she underwent upper endoscopy which was endoscopically normal   Duodenal biopsy showed patchy intraepithelial lymphocytes and subsequent celiac disease testing was negative  Colonoscopy shows moderate edema erosions of the terminal ileum and an aunt reversible stricture in the terminal ileum approximately 4 cm proximal to the ileocecal valve, erosions of the descending and sigmoid colon otherwise normal colonic mucosa    Ileal biopsy showed chronic ileitis with mild activity, stricture biopsy showed chronic ileitis with severe activity, rectum and sigmoid, descending biopsy showed chronic quiescent colitis with remainder of the colonic segments normal pathologically  She was then given a course of budesonide  Stelara level August 18th was undetectable at less than 0 3  Current symptoms:  She reports she never received S night and was told this was not approved by her insurance  She continues to have intermittent severe right lower quadrant abdominal pain which self-resolved  After she has a bowel movement this pain is also improved  She has low appetite and has lost 10 lb unintentionally in the last year  She notes oily food tearing symptoms worse  She tries to avoid roughage food  She did not start the iron and vitamin-D supplements which were previously prescribed her last dose of Stelara was approximately 6 weeks ago    June 2021 liver function tests were within normal limits, CBC shows hemoglobin 10 5, with a ferritin of 10, vitamin-D less than 25  She was started on daily iron and vitamin-D supplementation    IBD history:    She reports being diagnosed with Crohn's disease in 2013 after presenting with abdominal pain  She underwent colonoscopy in [place] and was diagnosed with Crohn's  She is unsure the distribution or the severity of her disease at that time       Prior treatment:  She believes she was  1st started on Pentasa which worked for approximately a year  She then required prednisone in addition to the Pentasa and had no improvement of her symptoms  At that time she was switched to another pill medication but does not recall the name  She was quickly transition to Humira which she was using for 2-3 years but then discontinued 2 years ago due to a not working  She was then transitioned to [facility] which she has been taking for the last 2 years  She uses Stelara every 8 weeks    She reports her last colonoscopy was in 2019 prior to Stelara initiation and reports inflammation was seen at that time but is unsure of the distribution of the severity            She reports no extra intestinal manifestation of her Crohn's disease in the past   She has not required prednisone for many years      She reports family history of her paternal grandfather and her brother with Crohn's disease  She has no history of brenda-anal disease     She reports a prior endoscopy many years ago but is unsure of the results   As stated above she reports her last colonoscopy was 2 years ago but is unsure what it showed      REVIEW OF SYSTEMS IS OTHERWISE NEGATIVE    Ten point review of systems negative other than stated as per HPI    Historical Information   Past Medical History:   Diagnosis Date    [facility] ([facility]     Crohn's disease ([facility] )      Past Surgical History:   Procedure Laterality Date    COLONOSCOPY  2019    EGD       Social History   Social History     Substance and Sexual Activity   Alcohol Use Not Currently     Social History     Substance and Sexual Activity   Drug Use Never     Social History     Tobacco Use   Smoking Status Never Smoker   Smokeless Tobacco Never Used     Family History   Problem Relation Age of Onset    Breast cancer additional onset Mother     ADD / ADHD Father     Diabetes Maternal Grandfather     Crohn's disease Paternal Grandfather        Meds/Allergies       Current Outpatient Medications:     cholecalciferol (VITAMIN D3) 400 units tablet    ferrous sulfate 324 (65 Fe) mg    Multiple Vitamin (multivitamin) tablet    norethindrone (MICRONOR) 0 35 MG tablet    Probiotic Product (PROBIOTIC-10 PO)    budesonide (ENTOCORT EC) 3 MG capsule    dicyclomine (BENTYL) 20 mg tablet    norethindrone (MICRONOR) 0 35 MG tablet    ustekinumab (Stelara) 90 mg/mL subcutaneous injection    No Known Allergies        Objective     Blood pressure 97/64, pulse 89, temperature 98 °F (36 7 °C), weight 49 8 kg (109 lb 12 8 oz)  Body mass index is 18 85 kg/m²  PHYSICAL EXAM:      General Appearance:   Alert, cooperative, no distress   HEENT:   Normocephalic, atraumatic, anicteric  Neck:  Supple, symmetrical, trachea midline   Lungs:   Clear to auscultation bilaterally; no rales, rhonchi or wheezing; respirations unlabored    Heart[de-identified]   Regular rate and rhythm; no murmur, rub, or gallop  Abdomen:   Soft, lower abdominal discomfort to deep palpation without rebound or guarding, non-distended; normal bowel sounds; no masses, no organomegaly    Genitalia:   Deferred    Rectal:   Deferred    Extremities:  No cyanosis, clubbing or edema    Pulses:  2+ and symmetric    Skin:  No jaundice, rashes, or lesions    Lymph nodes:  No palpable cervical lymphadenopathy        Lab Results:   No visits with results within 1 Day(s) from this visit  Latest known visit with results is:   Orders Only on 08/18/2021   Component Date Value    TISSUE TRANSGLUTAMINASE * 08/18/2021 1     IgA 08/18/2021 365*    Ustekinumab 08/18/2021 0 3*    Anti-Ustekinumab Antibody 08/18/2021 <10          Radiology Results:   No results found

## 2021-08-27 NOTE — TELEPHONE ENCOUNTER
I returned call to [facility] advised , this was error Martínez Terrazas will come from [facility]

## 2021-08-27 NOTE — TELEPHONE ENCOUNTER
I returned call from [place] , [place] Pharmacist   [phone]      Israel explained that his Pharmacy is actually set up to dispense some Biologics  Stelarea Humira etc     I will submit auth for Stelara         Submitted on cover my meds    Keycode-[id number]

## 2021-08-27 NOTE — TELEPHONE ENCOUNTER
Patients GI provider:  Dr Allan Mandujano    Number to return call: [phone]    Reason for call: Indra Guerrero from [address] calling about a prior auth that is need for the [facility]       Scheduled procedure/appointment date if applicable: Appt 56/4/14

## 2021-09-01 ENCOUNTER — TELEPHONE (OUTPATIENT)
Dept: GASTROENTEROLOGY | Facility: CLINIC | Age: 26
End: 2021-09-01

## 2021-09-01 NOTE — TELEPHONE ENCOUNTER
I returned call to [facility]    Patient has a pending auth on cover my meds      Patient is on Maintenance Stelara we have changed her frequency change to 1x every 28 days

## 2021-09-01 NOTE — TELEPHONE ENCOUNTER
I called pts insurance [employer] [facility] [place] [facility]      We submitted urgent verbal auth for frequency change to q28 days        I faxed clinicals to      Pending auth # [id number]                  Documentation    You 41 minutes ago (2:47 PM)   AB     I returned call to [address] [facility]  Patient has a pending auth on cover my meds        Patient is on Maintenance Stelara we have changed her frequency change to 1x every 28 days               Documentation    Nicole Schroeder routed conversation to Gastroenterology [address] [place] [address] 1 hour ago (2:24 PM)   Nicole Schroeder 1 hour ago (2:24 PM)   TUNG Lynch from [address] [place] [address] called stated someone needs to call #[phone] for approval on auth and state that this is her initial dose   Please advise and call Antione Lynch back at [phone]         Documentation    You Yesterday (9:29 AM)   AB     Auth still pending         Documentation    You 5 days ago   AB     I returned call from [place] , [place] Pharmacist   [phone]        Israel explained that his Pharmacy is actually set up to dispense some Biologics  Stelarea Humira etc      I will submit auth for Stelara            Submitted on cover my meds     Keycode-[id number]

## 2021-09-01 NOTE — TELEPHONE ENCOUNTER
I called pts insurance [employer] [facility]     We submitted urgent verbal auth for frequency change to q28 days      I faxed clinicals to     Pending auth # [id number]

## 2021-09-07 ENCOUNTER — TELEPHONE (OUTPATIENT)
Dept: GASTROENTEROLOGY | Facility: MEDICAL CENTER | Age: 26
End: 2021-09-07

## 2021-09-09 NOTE — TELEPHONE ENCOUNTER
Unfortunately not as of yet , I  roomed all day yesterday and rooming all day today, I will reach out later or tomorrow  Not enough time to call

## 2021-09-10 ENCOUNTER — TELEPHONE (OUTPATIENT)
Dept: GASTROENTEROLOGY | Facility: CLINIC | Age: 26
End: 2021-09-10

## 2021-09-10 NOTE — TELEPHONE ENCOUNTER
I called and spoke with patient to give update and to advise she should continue with every 8 week dosing until she get an approval, patient will contact [facility] for refill     Patient informed me she is moving at the end of this month to [place] and will be searching for GI DR in [facility]  I will e-mail appeal form needing pts signature and she will e-mail back to me once completed         Documentation    You 1 hour ago (1:20 PM)   AB     Hello      Received call from Marilu from [facility]  in regards to peer to peer and previous conversation     They want to deny another peer to peer at this time we are to submit Appeal form with letter and signed consent from patient and we are to  ask for a Standard appeal, to give the Internal review board time to review ( up to 10 ) business days      I will then fax to          Documentation    Waldemar Kumar PA-C  You; Mis Baker MD 1 hour ago (1:11 PM)   ANTONIETTA MELLOI-     I did speak to [facility]  They wanted the pt to switch to either Remicade or to add an immunomodulator  I explained the pt did not have antibodies & therefore felt the immunomodulator would not be beneficial  I also explained her drug levels were extremely low & she would benefit increasing her dose to q 4wks  They consider this off label & still wanted her to switch to Remicade  I explained that she was already on Humira & failed therapy & it did not make sense to go back to another anti-TNF  They asked if we could provide clinical documentation regarding specific studies on increasing Stelara to q 4 wks  I composed a letter found on the Crohn's & colitis foundation website that provided specific studies & this was faxed to them at [phone]  They stated they will review & let us know    Danielle         Documentation    You routed conversation to Gastroenterology [facility] Provider Yesterday (1:26 PM)   You Yesterday (1:25 PM)   AB     Unfortunately not as of yet , I  roomed all day yesterday and rooming all day today, I will reach out later or tomorrow  Not enough time to call  Documentation    Víctor Courtney PA-C  You Yesterday (1:12 PM)   SY     Do you have any updates on this? Danielle            Documentation    Teetee Blandon MA routed conversation to You Yesterday (1:11 PM)   Katherine Aragon PA-C  [facility]; Gastroenterology [address] [place] [address] Dr Provider 2 days ago     I never received a phone call from [facility] and now I am no longer the task PA  Could the medical assistant please call [facility] and find out what's happening with the peer to peer?      Routing comment    You  Katherine Aragon PA-C 7 days ago   AB     Thank You Florence  I called and gave update or telephone number  Documentation    Katherine Aragon PA-C  You 7 days ago     You can give them my cell phone [phone]  Routing comment    You routed conversation to Lexie Rasmussen MD; Gastroenterology [address] [place] [address] Provider 7 days ago   You 7 days ago   AB     I called Candance Gaskin for update         I spoke with rep who advised our request has been denied due to plan limited to 1 injection per 56 days , medical necessity not met     Alternative would be Remicade     I set up peer to peer for today between 1:30 and 4:40 or Tuesday the 7th            Once they call in I will try to reach PA/ Dr           Hi can I please get a direct line that I can patch the DR from peer to peer in when they call?     Thanks andres much  Documentation    You 8 days ago   AB     I called [facility] for update, I was advised still pending           Documentation

## 2021-09-10 NOTE — TELEPHONE ENCOUNTER
I called and spoke with patient to give update and to advise she should continue with every 8 week dosing until she get an approval, patient will contact [facility] for refill    Patient informed me she is moving at the end of this month to [place] and will be searching for GI  in [place]      I will e-mail appeal form needing pts signature and she will e-mail back to me once completed

## 2021-09-10 NOTE — TELEPHONE ENCOUNTER
Hello     Received call from Marilu from [facility]  in regards to peer to peer and previous conversation    They want to deny another peer to peer at this time we are to submit Appeal form with letter and signed consent from patient and we are to  ask for a Standard appeal, to give the Internal review board time to review ( up to 10 ) business days      I will then fax to

## 2021-09-10 NOTE — TELEPHONE ENCOUNTER
I called [facility] spoke with [facility]    Case # [id number]      We set up another peer to peer  I asked for this to be done TODAY since they have delayed peer to peer and  patient care also delayed  Who is on task ? Do you have direct line ?

## 2021-09-10 NOTE — TELEPHONE ENCOUNTER
Hi Ladies I am working on pts frequency change of Stelara, patient inquiring on auth for Budesonide,     Please assist Thank You

## 2021-09-13 NOTE — TELEPHONE ENCOUNTER
Can you provide me with the number and case number to call so that I can call and do another peer to peer as well?

## 2021-09-14 NOTE — TELEPHONE ENCOUNTER
[facility]   ID# [id number] [id number]    [phone]    [facility] did decline another peer to peer stating we need to fax letter and literature to support benefits of frequency change, [employer] faxed over        Please let me know if anything further is needed at this time      Thank You

## 2021-09-15 NOTE — TELEPHONE ENCOUNTER
Ok  Are we waiting for a response after the letter and literature were faxed?  If so I will wait to send another appeal myself

## 2021-09-16 DIAGNOSIS — K50.80 CROHN'S DISEASE OF BOTH SMALL AND LARGE INTESTINE WITHOUT COMPLICATION (HCC): ICD-10-CM

## 2021-09-16 NOTE — TELEPHONE ENCOUNTER
Medication refill  Insurance will not cover every 28 days  Will need a new prescription sent to [facility]

## 2021-09-17 RX ORDER — USTEKINUMAB 90 MG/ML
90 INJECTION, SOLUTION SUBCUTANEOUS
Qty: 1 ML | Refills: 5 | Status: SHIPPED | OUTPATIENT
Start: 2021-09-17

## 2021-09-17 NOTE — TELEPHONE ENCOUNTER
This has been deied we are working on appeal       Please place updated script for q56 days with refills until we can get denial overturned     Thank You

## 2021-12-29 ENCOUNTER — TELEPHONE (OUTPATIENT)
Dept: GASTROENTEROLOGY | Facility: MEDICAL CENTER | Age: 26
End: 2021-12-29

## 2022-03-16 ENCOUNTER — TELEPHONE (OUTPATIENT)
Dept: GASTROENTEROLOGY | Facility: MEDICAL CENTER | Age: 27
End: 2022-03-16

## 2022-03-16 NOTE — TELEPHONE ENCOUNTER
Emailed patient a Release of Health Information form to be filled out and sent back  Emailed to patient using e-mail on file

## 2022-04-22 ENCOUNTER — TELEPHONE (OUTPATIENT)
Dept: INTERNAL MEDICINE CLINIC | Facility: OTHER | Age: 27
End: 2022-04-22

## 2022-04-22 NOTE — TELEPHONE ENCOUNTER
Patient no longer living in [place], she does have a new PCP  Can we remove [facility] as she no longer sees us for her care